# Patient Record
(demographics unavailable — no encounter records)

---

## 2025-01-21 NOTE — REVIEW OF SYSTEMS
[Hair Loss] : hair loss [Fatigue] : no fatigue [Decreased Appetite] : appetite not decreased [Fever] : no fever [Chills] : no chills [Dry Eyes] : no dryness [Eyes Itch] : no itch [Dysphagia] : no dysphagia [Hearing Loss] : no hearing loss  [Chest Pain] : no chest pain [Palpitations] : no palpitations [Lower Ext Edema] : no lower extremity edema [Shortness Of Breath] : no shortness of breath [Cough] : no cough [Wheezing] : no wheezing [SOB on Exertion] : no shortness of breath on exertion [Nausea] : no nausea [Constipation] : no constipation [Heartburn] : no heartburn [Diarrhea] : no diarrhea [Polyuria] : no polyuria [Dysuria] : no dysuria [Nocturia] : no nocturia [Joint Pain] : no joint pain [Muscle Weakness] : no muscle weakness [Myalgia] : no myalgia  [Joint Stiffness] : no joint stiffness [Dry Skin] : no dry skin [Ulcer] : no ulcer [Headaches] : no headaches [Tremors] : no tremors [Pain/Numbness of Digits] : no pain/numbness of digits [Depression] : no depression [Anxiety] : no anxiety [Stress] : no stress [Polydipsia] : no polydipsia [Cold Intolerance] : no cold intolerance [Heat Intolerance] : no heat intolerance [Easy Bleeding] : no ~M tendency for easy bleeding [Easy Bruising] : no tendency for easy bruising [FreeTextEntry2] : Has lost another 4 lb since her last visit [FreeTextEntry3] : See comments in the HPI re: the cataract in her left eye [FreeTextEntry4] : Has chronic dry mouth 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

## 2025-01-21 NOTE — REVIEW OF SYSTEMS
[Hair Loss] : hair loss [Fatigue] : no fatigue [Decreased Appetite] : appetite not decreased [Fever] : no fever [Chills] : no chills [Dry Eyes] : no dryness [Eyes Itch] : no itch [Dysphagia] : no dysphagia [Hearing Loss] : no hearing loss  [Chest Pain] : no chest pain [Palpitations] : no palpitations [Lower Ext Edema] : no lower extremity edema [Shortness Of Breath] : no shortness of breath [Cough] : no cough [Wheezing] : no wheezing [SOB on Exertion] : no shortness of breath on exertion [Nausea] : no nausea [Constipation] : no constipation [Heartburn] : no heartburn [Diarrhea] : no diarrhea [Polyuria] : no polyuria [Dysuria] : no dysuria [Nocturia] : no nocturia [Joint Pain] : no joint pain [Muscle Weakness] : no muscle weakness [Myalgia] : no myalgia  [Joint Stiffness] : no joint stiffness [Dry Skin] : no dry skin [Ulcer] : no ulcer [Headaches] : no headaches [Tremors] : no tremors [Pain/Numbness of Digits] : no pain/numbness of digits [Depression] : no depression [Anxiety] : no anxiety [Stress] : no stress [Polydipsia] : no polydipsia [Cold Intolerance] : no cold intolerance [Heat Intolerance] : no heat intolerance [Easy Bleeding] : no ~M tendency for easy bleeding [Easy Bruising] : no tendency for easy bruising [FreeTextEntry2] : Has lost another 4 lb since her last visit [FreeTextEntry3] : See comments in the HPI re: the cataract in her left eye [FreeTextEntry4] : Has chronic dry mouth 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

## 2025-01-21 NOTE — PHYSICAL EXAM
[Alert] : alert [Well Nourished] : well nourished [Healthy Appearance] : healthy appearance [Normal Sclera/Conjunctiva] : normal sclera/conjunctiva [EOMI] : extra ocular movement intact [PERRL] : pupils equal, round and reactive to light [No Proptosis] : no proptosis [No Lid Lag] : no lid lag [Normal Outer Ear/Nose] : the ears and nose were normal in appearance [Normal Hearing] : hearing was normal [No Neck Mass] : no neck mass was observed [No LAD] : no lymphadenopathy [No Thyroid Nodules] : no palpable thyroid nodules [Clear to Auscultation] : lungs were clear to auscultation bilaterally [No Murmurs] : no murmurs [Normal Rate] : heart rate was normal [Regular Rhythm] : with a regular rhythm [Carotids Normal] : carotid pulses were normal with no bruits [No Edema] : no peripheral edema [Not Tender] : non-tender [Soft] : abdomen soft [No HSM] : no hepato-splenomegaly [Normal Supraclavicular Nodes] : no supraclavicular lymphadenopathy [Normal Anterior Cervical Nodes] : no anterior cervical lymphadenopathy [No CVA Tenderness] : no ~M costovertebral angle tenderness [Normal Gait] : normal gait [No Involuntary Movements] : no involuntary movements were seen [No Joint Swelling] : no joint swelling seen [Normal Strength/Tone] : muscle strength and tone were normal [No Rash] : no rash [No Motor Deficits] : the motor exam was normal [No Tremors] : no tremors [Normal Affect] : the affect was normal [Normal Mood] : the mood was normal [Thyroid Not Enlarged] : the thyroid was not enlarged [Kyphosis] : no kyphosis present [Acanthosis Nigricans] : no acanthosis nigricans [Hirsutism] : no hirsutism [de-identified] : Cataract is visible in the left eye [de-identified] : DP pulses non-palpable bilaterally but capillary refill is normal

## 2025-01-21 NOTE — PHYSICAL EXAM
[Alert] : alert [Well Nourished] : well nourished [Healthy Appearance] : healthy appearance [Normal Sclera/Conjunctiva] : normal sclera/conjunctiva [EOMI] : extra ocular movement intact [PERRL] : pupils equal, round and reactive to light [No Proptosis] : no proptosis [No Lid Lag] : no lid lag [Normal Outer Ear/Nose] : the ears and nose were normal in appearance [Normal Hearing] : hearing was normal [No Neck Mass] : no neck mass was observed [No LAD] : no lymphadenopathy [No Thyroid Nodules] : no palpable thyroid nodules [Clear to Auscultation] : lungs were clear to auscultation bilaterally [No Murmurs] : no murmurs [Normal Rate] : heart rate was normal [Regular Rhythm] : with a regular rhythm [Carotids Normal] : carotid pulses were normal with no bruits [No Edema] : no peripheral edema [Not Tender] : non-tender [Soft] : abdomen soft [No HSM] : no hepato-splenomegaly [Normal Supraclavicular Nodes] : no supraclavicular lymphadenopathy [Normal Anterior Cervical Nodes] : no anterior cervical lymphadenopathy [No CVA Tenderness] : no ~M costovertebral angle tenderness [Normal Gait] : normal gait [No Involuntary Movements] : no involuntary movements were seen [No Joint Swelling] : no joint swelling seen [Normal Strength/Tone] : muscle strength and tone were normal [No Rash] : no rash [No Motor Deficits] : the motor exam was normal [No Tremors] : no tremors [Normal Affect] : the affect was normal [Normal Mood] : the mood was normal [Thyroid Not Enlarged] : the thyroid was not enlarged [Kyphosis] : no kyphosis present [Acanthosis Nigricans] : no acanthosis nigricans [Hirsutism] : no hirsutism [de-identified] : Cataract is visible in the left eye [de-identified] : DP pulses non-palpable bilaterally but capillary refill is normal

## 2025-01-21 NOTE — DATA REVIEWED
[FreeTextEntry1] : [facility] [employer]  (1/15/25)  FBS 95,  A1c 6.0% CMP WNL , HDL 86, TG 83 TSH 2.35 uU/ml  (0.27-4.2) 25-D level in target range at 35 ng/ml

## 2025-01-21 NOTE — HISTORY OF PRESENT ILLNESS
[FreeTextEntry1] : 53-year-old woman who is followed for hypothyroidism (initially diagnosed in 2011) and pre-diabetes..  She reported fluctuating Synthroid requirements prior to her initial visit to me in 2017, but this was likely due to poor compliance with her regimen.  Her TFTs have been distinctly more stable in the past few years.  She was also noted to have an A1c level in the pre-diabetes range (5.9%) in August of 2020.  Her other active medical problems include mild depression and an intermittent RLE radiculopathy.  She now returns after a hiatus of over a year. Interim history since her last visit is significant for the discovery of a cataract (likely traumatic) in her left eye, which is significantly impairing her vision.  She is not aware of any previous trauma to that eye.  She has been told that she is a candidate for surgery, but has not scheduled this as yet. Has lost 4 lb since her last visit.  Attributes this to decreased PO intake when she is under stress, also says "sometimes I just forget to eat." Taking Synthroid consistently and correctly--at least 30 min before breakfast Attempted to review her diet--but it is too variable to pin down.   --Eats approximately 5 eggs a week --Eats a modest amount of cheese--usually not every day --Eats yogurt at least a few times a week--sometimes 2%, sometimes full-fat --Has only occasional ice cream --Eats chocolate chip cookies at least a few times a week --Seems to be eating more than 3 fruit servings a day, and sometimes more than one serving at a time. Has been menopausal since 2019.  Last gyn exam was at least 2 years ago Is not working at present.. (Is being supported by her family).

## 2025-01-21 NOTE — ASSESSMENT
[FreeTextEntry2] : Diet, gyn exam, DEXA [FreeTextEntry1] : 1) Hypothyroidism:  TSH level is in the optimal range of 0.4-2.5 uU/ml.  Her Synthroid dose has been stable for the past few years, and she has clearly been quite compliant with her regimen --Continue Synthroid (brand) 75 mcg/day  2) Pre-diabetes:  A1c level remains in the pre-diabetes range, though without any clear-cut progression toward actual type 2 DM--values have remained between 5.9 and 6.1% since 2021.  Her FBS is below the threshold for impaired fasting glucose.  The elevated A1c level presumably reflects intermittent hyperglycemia after diet lapses with concentrated sweets. --Diet was reinforced.  Obviously needs to eliminate the concentrated sweets --Given the lack of progression toward actual DM, her lack of obesity and her normal FBS, will not start metformin  3) Hyperlipidemia:  LDL-cholesterol level remains well above the pre-diabetes target of 100 mg%, but at this level is not an indication for statin therapy.  She has some room for additional dietary modification. --Diet was discussed in detail.  She needs to eliminate the full-fat dairy products and the baked goods.  (She did not realize that cookies, etc usually have saturated fat)  4) Menopause:  She is now 5 years post menopause.  Has not had a gyn exam in over 2 years, and is not sure whether she has had a DEXA. --Strongly advised to have an updated gyn exam --Asked her to check with her gynecologist whether she previously had a DEXA  See for follow-up in 6 months.  CMP, lipids, A1c, TFTs before the visit

## 2025-01-21 NOTE — DATA REVIEWED
[FreeTextEntry1] : [facility] [place]  (1/15/25)  FBS 95,  A1c 6.0% CMP WNL , HDL 86, TG 83 TSH 2.35 uU/ml  (0.27-4.2) 25-D level in target range at 35 ng/ml